# Patient Record
Sex: MALE | Race: BLACK OR AFRICAN AMERICAN | NOT HISPANIC OR LATINO | Employment: UNEMPLOYED | URBAN - METROPOLITAN AREA
[De-identification: names, ages, dates, MRNs, and addresses within clinical notes are randomized per-mention and may not be internally consistent; named-entity substitution may affect disease eponyms.]

---

## 2020-03-30 ENCOUNTER — HOSPITAL ENCOUNTER (EMERGENCY)
Facility: HOSPITAL | Age: 25
Discharge: HOME | End: 2020-03-30
Attending: EMERGENCY MEDICINE

## 2020-03-30 VITALS
RESPIRATION RATE: 17 BRPM | WEIGHT: 150 LBS | BODY MASS INDEX: 22.22 KG/M2 | OXYGEN SATURATION: 100 % | HEIGHT: 69 IN | SYSTOLIC BLOOD PRESSURE: 134 MMHG | HEART RATE: 86 BPM | DIASTOLIC BLOOD PRESSURE: 72 MMHG | TEMPERATURE: 99 F

## 2020-03-30 DIAGNOSIS — R05.9 COUGH: ICD-10-CM

## 2020-03-30 DIAGNOSIS — J06.9 VIRAL URI WITH COUGH: ICD-10-CM

## 2020-03-30 DIAGNOSIS — J02.9 PHARYNGITIS, UNSPECIFIED ETIOLOGY: Primary | ICD-10-CM

## 2020-03-30 LAB — GROUP A STREP, MOLECULAR: NEGATIVE

## 2020-03-30 PROCEDURE — 87651 STREP A DNA AMP PROBE: CPT

## 2020-03-30 PROCEDURE — 25000003 PHARM REV CODE 250: Performed by: PHYSICIAN ASSISTANT

## 2020-03-30 PROCEDURE — 99284 EMERGENCY DEPT VISIT MOD MDM: CPT | Mod: 25

## 2020-03-30 RX ORDER — CETIRIZINE HYDROCHLORIDE 10 MG/1
10 TABLET ORAL DAILY
Qty: 12 TABLET | Refills: 0 | Status: SHIPPED | OUTPATIENT
Start: 2020-03-30 | End: 2020-04-11

## 2020-03-30 RX ORDER — FLUTICASONE PROPIONATE 50 MCG
1 SPRAY, SUSPENSION (ML) NASAL 2 TIMES DAILY PRN
Qty: 15 G | Refills: 0 | Status: SHIPPED | OUTPATIENT
Start: 2020-03-30 | End: 2020-04-29

## 2020-03-30 RX ORDER — IBUPROFEN 600 MG/1
600 TABLET ORAL EVERY 6 HOURS PRN
Qty: 20 TABLET | Refills: 0 | Status: SHIPPED | OUTPATIENT
Start: 2020-03-30 | End: 2020-04-04

## 2020-03-30 RX ORDER — ACETAMINOPHEN 500 MG
500 TABLET ORAL
Status: COMPLETED | OUTPATIENT
Start: 2020-03-30 | End: 2020-03-30

## 2020-03-30 RX ORDER — ACETAMINOPHEN 500 MG
500 TABLET ORAL EVERY 4 HOURS PRN
Qty: 20 TABLET | Refills: 0 | Status: SHIPPED | OUTPATIENT
Start: 2020-03-30 | End: 2020-04-04

## 2020-03-30 RX ORDER — BENZONATATE 100 MG/1
100 CAPSULE ORAL 3 TIMES DAILY PRN
Qty: 20 CAPSULE | Refills: 0 | Status: SHIPPED | OUTPATIENT
Start: 2020-03-30 | End: 2020-04-06

## 2020-03-30 RX ORDER — IBUPROFEN 600 MG/1
600 TABLET ORAL
Status: COMPLETED | OUTPATIENT
Start: 2020-03-30 | End: 2020-03-30

## 2020-03-30 RX ORDER — BENZONATATE 100 MG/1
100 CAPSULE ORAL
Status: COMPLETED | OUTPATIENT
Start: 2020-03-30 | End: 2020-03-30

## 2020-03-30 RX ADMIN — BENZONATATE 100 MG: 100 CAPSULE ORAL at 07:03

## 2020-03-30 RX ADMIN — IBUPROFEN 600 MG: 600 TABLET, FILM COATED ORAL at 07:03

## 2020-03-30 RX ADMIN — ACETAMINOPHEN 500 MG: 500 TABLET ORAL at 07:03

## 2020-03-31 NOTE — ED PROVIDER NOTES
Encounter Date: 3/30/2020       History     Chief Complaint   Patient presents with    Sore Throat     pt reports sore throat and productive cough with yellow sputum ongoing 1 week; no meds taken today for symptoms; pt denies known fever or any other symptoms    Cough     CC: Sore Throat; Cough     HPI:   23 y/o male with no pertinent medical history presenting for evaluation of 1 week history of productive cough with yellow sputum and constant sore throat worse with cough. 8/10 in severity. Initially had chills and rhinorrhea that resolved. Able to tolerate PO solids and liquids. Denies sick contacts. No attempted tx. Denies fever,  nasal congestion,  CP, SOB, dizziness, lightheadedness.         Review of patient's allergies indicates:  No Known Allergies  History reviewed. No pertinent past medical history.  History reviewed. No pertinent surgical history.  History reviewed. No pertinent family history.  Social History     Tobacco Use    Smoking status: Never Smoker    Smokeless tobacco: Never Used   Substance Use Topics    Alcohol use: Not on file    Drug use: Not on file     Review of Systems   Constitutional: Positive for chills. Negative for fever.   HENT: Positive for rhinorrhea and sore throat. Negative for congestion, ear pain and trouble swallowing.    Eyes: Negative for redness.   Respiratory: Positive for cough. Negative for shortness of breath and stridor.    Cardiovascular: Negative for chest pain.   Gastrointestinal: Negative for diarrhea, nausea and vomiting.   Musculoskeletal: Negative for back pain, myalgias and neck pain.   Skin: Negative for rash.   Neurological: Negative for dizziness, speech difficulty and light-headedness.   Psychiatric/Behavioral: Negative for confusion.       Physical Exam     Initial Vitals [03/30/20 1926]   BP Pulse Resp Temp SpO2   134/72 86 17 99.2 °F (37.3 °C) 100 %      MAP       --         Physical Exam    Nursing note and vitals reviewed.  Constitutional: He  appears well-developed and well-nourished. No distress.   HENT:   Head: Normocephalic.   Right Ear: Hearing, tympanic membrane, external ear and ear canal normal.   Left Ear: Hearing, tympanic membrane, external ear and ear canal normal.   Nose: Nose normal.   Mouth/Throat: Uvula is midline and mucous membranes are normal. Posterior oropharyngeal erythema present. No oropharyngeal exudate or posterior oropharyngeal edema.   Postnasal drip   Eyes: Conjunctivae are normal.   Neck: Neck supple.   Cardiovascular: Normal rate and regular rhythm. Exam reveals no gallop and no friction rub.    No murmur heard.  Pulmonary/Chest: Breath sounds normal. No respiratory distress. He has no wheezes. He has no rhonchi. He has no rales.   Abdominal: Soft. Bowel sounds are normal. He exhibits no distension. There is no tenderness. There is no rebound and no guarding.   Lymphadenopathy:     He has no cervical adenopathy.   Neurological: He is alert.   Skin: Skin is warm and dry. No rash noted.   Psychiatric: He has a normal mood and affect.         ED Course   Procedures  Labs Reviewed   GROUP A STREP, MOLECULAR          Imaging Results          X-Ray Chest AP Portable (Final result)  Result time 03/30/20 20:24:10    Final result by Ruben Stoner MD (03/30/20 20:24:10)                 Impression:      No radiographic evidence of pneumonia or other source of cough, noting that early/mild viral pneumonia may be radiographically occult.      Electronically signed by: Ruben Stoner MD  Date:    03/30/2020  Time:    20:24             Narrative:    EXAMINATION:  XR CHEST AP PORTABLE    CLINICAL HISTORY:  Cough    TECHNIQUE:  Single frontal view of the chest was performed.    COMPARISON:  None    FINDINGS:  The lungs are clear, with normal appearance of pulmonary vasculature and no pleural effusion or pneumothorax.    The cardiac silhouette is normal in size. The hilar and mediastinal contours are unremarkable.    Bones are intact.                                  Medical Decision Making:   Initial Assessment:   24-year-old male with no pertinent past medical history presenting for evaluation of productive cough and sore throat.  Patient is afebrile, nontoxic appearing in no distress.  Exam above.  Strep swab is negative.  Chest x-ray negative for pneumonia or acute abnormality.  Think this is likely viral URI with cough.  No evidence ofperitonsillar or retropharyngeal abscess or airway compromise. Follow up with primary care. Return to ER for worsening symptoms or as needed.                                  Clinical Impression:       ICD-10-CM ICD-9-CM   1. Pharyngitis, unspecified etiology J02.9 462   2. Cough R05 786.2   3. Viral URI with cough J06.9 465.9    B97.89              ED Disposition Condition    Discharge Stable        ED Prescriptions     Medication Sig Dispense Start Date End Date Auth. Provider    ibuprofen (ADVIL,MOTRIN) 600 MG tablet Take 1 tablet (600 mg total) by mouth every 6 (six) hours as needed for Pain. 20 tablet 3/30/2020 4/4/2020 Jahaira Moore PA-C    benzonatate (TESSALON) 100 MG capsule Take 1 capsule (100 mg total) by mouth 3 (three) times daily as needed for Cough. 20 capsule 3/30/2020 4/6/2020 Jahaira Moore PA-C    acetaminophen (TYLENOL) 500 MG tablet Take 1 tablet (500 mg total) by mouth every 4 (four) hours as needed. 20 tablet 3/30/2020 4/4/2020 Jahaira Moore PA-C    fluticasone propionate (FLONASE) 50 mcg/actuation nasal spray 1 spray (50 mcg total) by Each Nostril route 2 (two) times daily as needed for Rhinitis. 15 g 3/30/2020 4/29/2020 Jahaira Moore PA-C    cetirizine (ZYRTEC) 10 MG tablet Take 1 tablet (10 mg total) by mouth once daily. for 12 days 12 tablet 3/30/2020 4/11/2020 Jahaira Moore PA-C        Follow-up Information     Follow up With Specialties Details Why Contact Info    [facility] - [address]  [address]  [phone]      [facility]  Schedule an appointment as soon as possible for a visit   [address] [facility] [address]  [phone]      [facility] Go to  As needed, If symptoms worsen [address]  [facility] [id number] [phone]                                     Jahaira Moore PA-C  03/30/20 2129

## 2020-03-31 NOTE — DISCHARGE INSTRUCTIONS
Take Tessalon for cough, ibuprofen and tylenol for sore throat, flonase and zyrtec for postnasal drip. Follow up with primary care. Return to ER for worsening symptoms or as needed.

## 2020-04-11 ENCOUNTER — HOSPITAL ENCOUNTER (EMERGENCY)
Facility: HOSPITAL | Age: 25
Discharge: HOME | End: 2020-04-11
Attending: EMERGENCY MEDICINE

## 2020-04-11 VITALS
WEIGHT: 150 LBS | BODY MASS INDEX: 22.22 KG/M2 | HEART RATE: 77 BPM | RESPIRATION RATE: 18 BRPM | SYSTOLIC BLOOD PRESSURE: 125 MMHG | HEIGHT: 69 IN | TEMPERATURE: 99 F | DIASTOLIC BLOOD PRESSURE: 60 MMHG | OXYGEN SATURATION: 99 %

## 2020-04-11 DIAGNOSIS — S01.311A LACERATION OF RIGHT EAR LOBE, INITIAL ENCOUNTER: Primary | ICD-10-CM

## 2020-04-11 DIAGNOSIS — S01.81XA FACIAL LACERATION, INITIAL ENCOUNTER: ICD-10-CM

## 2020-04-11 PROCEDURE — 63600175 PHARM REV CODE 636 W HCPCS: Mod: SL | Performed by: PHYSICIAN ASSISTANT

## 2020-04-11 PROCEDURE — 25000003 PHARM REV CODE 250: Performed by: PHYSICIAN ASSISTANT

## 2020-04-11 PROCEDURE — 90471 IMMUNIZATION ADMIN: CPT | Mod: VFC | Performed by: PHYSICIAN ASSISTANT

## 2020-04-11 PROCEDURE — 99284 EMERGENCY DEPT VISIT MOD MDM: CPT | Mod: 25

## 2020-04-11 PROCEDURE — 90715 TDAP VACCINE 7 YRS/> IM: CPT | Mod: SL | Performed by: PHYSICIAN ASSISTANT

## 2020-04-11 PROCEDURE — 12013 RPR F/E/E/N/L/M 2.6-5.0 CM: CPT

## 2020-04-11 RX ORDER — ACETAMINOPHEN 500 MG
500 TABLET ORAL EVERY 4 HOURS PRN
Qty: 20 TABLET | Refills: 0 | Status: SHIPPED | OUTPATIENT
Start: 2020-04-11 | End: 2020-04-16

## 2020-04-11 RX ORDER — IBUPROFEN 600 MG/1
600 TABLET ORAL EVERY 6 HOURS PRN
Qty: 20 TABLET | Refills: 0 | Status: SHIPPED | OUTPATIENT
Start: 2020-04-11 | End: 2020-04-16

## 2020-04-11 RX ORDER — LIDOCAINE HYDROCHLORIDE 10 MG/ML
10 INJECTION INFILTRATION; PERINEURAL
Status: COMPLETED | OUTPATIENT
Start: 2020-04-11 | End: 2020-04-11

## 2020-04-11 RX ADMIN — CLOSTRIDIUM TETANI TOXOID ANTIGEN (FORMALDEHYDE INACTIVATED), CORYNEBACTERIUM DIPHTHERIAE TOXOID ANTIGEN (FORMALDEHYDE INACTIVATED), BORDETELLA PERTUSSIS TOXOID ANTIGEN (GLUTARALDEHYDE INACTIVATED), BORDETELLA PERTUSSIS FILAMENTOUS HEMAGGLUTININ ANTIGEN (FORMALDEHYDE INACTIVATED), BORDETELLA PERTUSSIS PERTACTIN ANTIGEN, AND BORDETELLA PERTUSSIS FIMBRIAE 2/3 ANTIGEN 0.5 ML: 5; 2; 2.5; 5; 3; 5 INJECTION, SUSPENSION INTRAMUSCULAR at 08:04

## 2020-04-11 RX ADMIN — LIDOCAINE HYDROCHLORIDE 10 ML: 10 INJECTION, SOLUTION INFILTRATION; PERINEURAL at 09:04

## 2020-04-12 NOTE — DISCHARGE INSTRUCTIONS
Follow up for wound check in 2 days. Return to ER for fever, worsening pain, redness, purulent drainage or as needed.

## 2020-04-12 NOTE — ED TRIAGE NOTES
Pt presents to ED with ear laceration. Pt reports falling down metal stairs and laceration at right ear and right side of face. Bleeding controlled. NAD noted. Denies taking meds PTA.

## 2020-04-12 NOTE — ED PROVIDER NOTES
"Encounter Date: 4/11/2020       History     Chief Complaint   Patient presents with    Ear Laceration     "I fell down the stairs chasing my nephew". Pt sustained a rt ear laceration. States hit his ear on the edge of the some metal stairs. Bleeding controlled.     Otalgia     Chief complaint:  Ear laceration, otalgia    HPI:    24-year-old male with no pertinent past medical history presenting for evaluation of right ear laceration and laceration to right side of face after falling down the stairs while chasing his nephew.  He reports he had his ear on the edge of metal stairs.  His tetanus is not up-to-date.  Denies headache, loss of consciousness, visual disturbance, nausea vomiting, hearing loss, weakness, paresthesias, neck pain or back pain.  No attempted treatment        Review of patient's allergies indicates:  No Known Allergies  History reviewed. No pertinent past medical history.  History reviewed. No pertinent surgical history.  History reviewed. No pertinent family history.  Social History     Tobacco Use    Smoking status: Never Smoker    Smokeless tobacco: Never Used   Substance Use Topics    Alcohol use: Never     Frequency: Never    Drug use: Not on file     Review of Systems   Constitutional: Negative for chills and fever.   HENT: Positive for ear pain. Negative for ear discharge, facial swelling, hearing loss, nosebleeds and trouble swallowing.    Eyes: Negative for visual disturbance.   Respiratory: Negative for shortness of breath and stridor.    Gastrointestinal: Negative for nausea and vomiting.   Musculoskeletal: Negative for back pain and neck pain.   Skin: Positive for wound.   Neurological: Negative for dizziness, seizures, speech difficulty, weakness, light-headedness, numbness and headaches.   Psychiatric/Behavioral: Negative for confusion.       Physical Exam     Initial Vitals [04/11/20 2008]   BP Pulse Resp Temp SpO2   (!) 126/59 108 20 98.9 °F (37.2 °C) 99 %      MAP       --   "       Physical Exam    Nursing note and vitals reviewed.  Constitutional: He appears well-developed and well-nourished. No distress.   HENT:   Right Ear: Hearing, tympanic membrane and ear canal normal. Right ear exhibits lacerations. Tympanic membrane is not perforated and not erythematous.   Left Ear: External ear normal.   Eyes: Conjunctivae and EOM are normal. Pupils are equal, round, and reactive to light.   Neck: Normal range of motion.   Musculoskeletal: Normal range of motion.   Neurological: He is alert. He has normal strength. No cranial nerve deficit or sensory deficit.   Skin: Skin is warm and dry.   2 Laceration through the helix and scapha of the R ear. Bleeding controlled. No involvement of ear canal.    1.5 cm laceration to R cheek in preauricular region    Superficial 1 cm abrasion above the R ear with no active bleeding   Psychiatric: He has a normal mood and affect.         ED Course   Lac Repair  Date/Time: 4/11/2020 10:40 PM  Performed by: Jahaira Moore PA-C  Authorized by: Mil Koo MD   Body area: head/neck  Location details: right ear  Laceration length: 2 cm  Foreign bodies: no foreign bodies  Tendon involvement: none  Nerve involvement: none  Vascular damage: no  Anesthesia: local infiltration    Anesthesia:  Local Anesthetic: lidocaine 1% without epinephrine  Anesthetic total: 12 mL  Preparation: Patient was prepped and draped in the usual sterile fashion.  Irrigation solution: saline  Irrigation method: jet lavage  Amount of cleaning: standard  Skin closure: 5-0 nylon  Number of sutures: 9  Technique: simple  Approximation: close  Approximation difficulty: simple  Dressing: petrolatum-impregnated gauze and pressure dressing  Patient tolerance: Patient tolerated the procedure well with no immediate complications    Lac Repair  Date/Time: 4/11/2020 10:30 PM  Performed by: Jahaira Moore PA-C  Authorized by: Mil Koo MD   Body area: head/neck  Location  details: right cheek  Laceration length: 1 cm  Foreign bodies: no foreign bodies  Tendon involvement: none  Nerve involvement: none  Anesthesia: local infiltration    Anesthesia:  Local Anesthetic: lidocaine 1% without epinephrine  Anesthetic total: 1 mL  Preparation: Patient was prepped and draped in the usual sterile fashion.  Irrigation solution: saline  Irrigation method: jet lavage  Amount of cleaning: standard  Skin closure: 5-0 nylon  Number of sutures: 3  Technique: simple  Approximation: close  Approximation difficulty: simple  Patient tolerance: Patient tolerated the procedure well with no immediate complications        Labs Reviewed - No data to display       Imaging Results    None          Medical Decision Making:   Initial Assessment:   24-year-old male presenting for your laceration after fall that occurred prior to arrival.  Tetanus updated in the ED. Laceration repaired per procedure note.  No injury to the ear canal or to the TM.  He denies any hearing loss.  No focal neurologic deficits.  Denies neck pain or back pain.  The patient follow up in 2 days for wound check and suture removal in 7-10 days.  Will have him return to the emergency department for worsening symptoms or as needed. Advised of scar and possible permanent swelling and scarring.  Instructed the patient to apply ice to help with swelling.  Pressure dressing was applied to help with swelling. Will have him return to ER for fever, worsening redness and swellign or as needed.                                  Clinical Impression:       ICD-10-CM ICD-9-CM   1. Laceration of right ear lobe, initial encounter S01.311A 872.01   2. Facial laceration, initial encounter S01.81XA 873.40             ED Disposition Condition    Discharge Stable        ED Prescriptions     Medication Sig Dispense Start Date End Date Auth. Provider    ibuprofen (ADVIL,MOTRIN) 600 MG tablet Take 1 tablet (600 mg total) by mouth every 6 (six) hours as needed for  Pain. 20 tablet 4/11/2020 4/16/2020 Jahaira Moore PA-C    acetaminophen (TYLENOL) 500 MG tablet Take 1 tablet (500 mg total) by mouth every 4 (four) hours as needed. 20 tablet 4/11/2020 4/16/2020 Jahaira Moore PA-C        Follow-up Information     Follow up With Specialties Details Why Contact Info    [facility]    [address] [facility] [address]  [phone]      [facility]  Schedule an appointment as soon as possible for a visit   [address] [facility] [address]  [phone]      [facility] Emergency Medicine Go to  As needed, If symptoms worsen [facility] [address]  [facility] [address] [phone]                                     Jahaira Moore PA-C  04/12/20 0029

## 2020-04-27 ENCOUNTER — HOSPITAL ENCOUNTER (EMERGENCY)
Facility: HOSPITAL | Age: 25
Discharge: HOME | End: 2020-04-27
Attending: EMERGENCY MEDICINE

## 2020-04-27 VITALS
HEIGHT: 69 IN | RESPIRATION RATE: 16 BRPM | OXYGEN SATURATION: 97 % | HEART RATE: 72 BPM | WEIGHT: 155 LBS | DIASTOLIC BLOOD PRESSURE: 62 MMHG | TEMPERATURE: 98 F | BODY MASS INDEX: 22.96 KG/M2 | SYSTOLIC BLOOD PRESSURE: 115 MMHG

## 2020-04-27 DIAGNOSIS — Z48.02 ENCOUNTER FOR REMOVAL OF SUTURES: Primary | ICD-10-CM

## 2020-04-27 PROCEDURE — 25000003 PHARM REV CODE 250: Performed by: PHYSICIAN ASSISTANT

## 2020-04-27 PROCEDURE — 99283 EMERGENCY DEPT VISIT LOW MDM: CPT

## 2020-04-27 RX ORDER — MUPIROCIN 20 MG/G
1 OINTMENT TOPICAL
Status: COMPLETED | OUTPATIENT
Start: 2020-04-27 | End: 2020-04-27

## 2020-04-27 RX ORDER — MUPIROCIN 20 MG/G
OINTMENT TOPICAL 3 TIMES DAILY
Qty: 15 G | Refills: 0 | Status: SHIPPED | OUTPATIENT
Start: 2020-04-27 | End: 2020-05-04

## 2020-04-27 RX ADMIN — MUPIROCIN 22 G: 20 OINTMENT TOPICAL at 05:04

## 2020-04-27 NOTE — ED PROVIDER NOTES
"Encounter Date: 4/27/2020       History     Chief Complaint   Patient presents with    Suture / Staple Removal     " I am here to get the sutures removed from my ear (right)."     Chief complaint:  Suture removal    HPI:    [age] year male no pertinent past medical history presenting for suture removal after sutures placed at this facility on 4/11/2020 for ear and face laceration. He states 2 of the stiches to R cheek fell out.  Denies any fever, chills, purulent drainage, associated pain, nausea vomiting.  He is currently taking antibiotics as prescribed and has 2 days left.        Review of patient's allergies indicates:  No Known Allergies  History reviewed. No pertinent past medical history.  History reviewed. No pertinent surgical history.  History reviewed. No pertinent family history.  Social History     Tobacco Use    Smoking status: Never Smoker    Smokeless tobacco: Never Used   Substance Use Topics    Alcohol use: Never     Frequency: Never    Drug use: Not on file     Review of Systems   Constitutional: Negative for chills and fever.   HENT: Negative for trouble swallowing.    Eyes: Negative for redness.   Respiratory: Negative for stridor.    Gastrointestinal: Negative for nausea and vomiting.   Musculoskeletal: Negative for back pain and neck pain.   Skin: Positive for wound. Negative for color change.   Neurological: Negative for speech difficulty, weakness and numbness.   Psychiatric/Behavioral: Negative for confusion.       Physical Exam     Initial Vitals [04/27/20 1639]   BP Pulse Resp Temp SpO2   115/62 72 16 98 °F (36.7 °C) 97 %      MAP       --         Physical Exam    Nursing note and vitals reviewed.  Constitutional: He appears well-developed and well-nourished. No distress.   HENT:   Head: Normocephalic.   Left Ear: External ear normal.   Eyes: Conjunctivae are normal.   Musculoskeletal: Normal range of motion.   Neurological: He is alert. No sensory deficit.   Skin: Skin is warm " and dry.   9 sutures in place to R ear. Nontendner. No surrounding erythema or purulent drainage.     Single suture in place to R cheek   Psychiatric: He has a normal mood and affect.         ED Course   Suture Removal  Date/Time: 4/27/2020 5:28 PM  Location procedure was performed: [facility] EMERGENCY DEPARTMENT  Performed by: Jahaira Moore PA-C  Authorized by: Star Garcia MD   Body area: head/neck  Location details: right ear  Wound Appearance: clean, nontender, well healed, normal color, no drainage and nonpurulent  Sutures Removed: 9  Post-removal: antibiotic ointment applied  Facility: sutures placed in this facility  Patient tolerance: Patient tolerated the procedure well with no immediate complications    Suture Removal  Date/Time: 4/27/2020 6:13 PM  Location procedure was performed: [facility] EMERGENCY DEPARTMENT  Performed by: Jahaira Moore PA-C  Authorized by: Star Garcia MD   Body area: head/neck  Location details: right cheek  Sutures Removed: 1  Facility: sutures placed in this facility  Patient tolerance: Patient tolerated the procedure well with no immediate complications        Labs Reviewed - No data to display       Imaging Results    None          Medical Decision Making:   Initial Assessment:   [age] year male presenting for suture removal to the right ear and R cheek.  Sutures removed per procedure note.  No evidence of infection. Advised of scar. Will have pt follow up with primary care. Return to ER for worsening symptoms or as needed.                                      Clinical Impression:       ICD-10-CM ICD-9-CM   1. Encounter for removal of sutures Z48.02 V58.32             ED Disposition Condition    Discharge Stable        ED Prescriptions     Medication Sig Dispense Start Date End Date Auth. Provider    mupirocin (BACTROBAN) 2 % ointment Apply topically 3 (three) times daily. for 7 days 15 g 4/27/2020 5/4/2020 Jahaira Moore PA-C        Follow-up  Information     Follow up With Specialties Details Why Contact Info    [facility]    [address] [facility] [address]  [phone]      [facility]  Schedule an appointment as soon as possible for a visit   [address] [facility] [address]  [phone]      [facility] Emergency Medicine Go to  If symptoms worsen, As needed [address] [facility] [address] [phone]                                     Jahaira Moore PA-C  04/27/20 6649